# Patient Record
Sex: MALE | Race: WHITE | Employment: FULL TIME | URBAN - METROPOLITAN AREA
[De-identification: names, ages, dates, MRNs, and addresses within clinical notes are randomized per-mention and may not be internally consistent; named-entity substitution may affect disease eponyms.]

---

## 2017-08-09 ENCOUNTER — APPOINTMENT (OUTPATIENT)
Dept: OTHER | Facility: HOSPITAL | Age: 52
End: 2017-08-09
Attending: FAMILY MEDICINE

## 2017-08-10 PROBLEM — R11.2 NON-INTRACTABLE VOMITING WITH NAUSEA, UNSPECIFIED VOMITING TYPE: Status: ACTIVE | Noted: 2017-08-10

## 2017-08-18 PROCEDURE — 88305 TISSUE EXAM BY PATHOLOGIST: CPT | Performed by: SPECIALIST

## 2019-03-21 PROCEDURE — 88305 TISSUE EXAM BY PATHOLOGIST: CPT | Performed by: SPECIALIST

## 2019-04-04 ENCOUNTER — HOSPITAL ENCOUNTER (OUTPATIENT)
Age: 54
Discharge: HOME | End: 2019-04-04
Payer: COMMERCIAL

## 2019-04-04 VITALS
OXYGEN SATURATION: 98 % | WEIGHT: 145 LBS | BODY MASS INDEX: 21.98 KG/M2 | HEART RATE: 68 BPM | HEIGHT: 68 IN | RESPIRATION RATE: 16 BRPM | DIASTOLIC BLOOD PRESSURE: 84 MMHG | SYSTOLIC BLOOD PRESSURE: 125 MMHG | TEMPERATURE: 98 F

## 2019-04-04 DIAGNOSIS — S61.211A LACERATION OF LEFT INDEX FINGER WITHOUT FOREIGN BODY WITHOUT DAMAGE TO NAIL, INITIAL ENCOUNTER: Primary | ICD-10-CM

## 2019-04-04 PROCEDURE — 99213 OFFICE O/P EST LOW 20 MIN: CPT

## 2019-04-04 PROCEDURE — 12001 RPR S/N/AX/GEN/TRNK 2.5CM/<: CPT

## 2019-04-04 PROCEDURE — 99212 OFFICE O/P EST SF 10 MIN: CPT

## 2019-04-04 NOTE — ED PROVIDER NOTES
[place] Se[place] [id number] [facility] [place]    History   Patient presents with:  Laceration Abrasion (integumentary)    Stated Complaint: left hand laceration     HPI    Patient is a pleasant 38-year-old male.   Medical history of hypertension and hy 172.7 cm (5' 8\")   Wt 65.8 kg   SpO2 98%   BMI 22.05 kg/m²         Physical Exam    Gen: Well appearing, well groomed, alert and aware x 3  Neck: Supple, full range of motion, no thyromegaly or lymphadenopathy.   Eye examination: EOMs are intact, normal co

## 2019-04-12 ENCOUNTER — HOSPITAL ENCOUNTER (OUTPATIENT)
Age: 54
Discharge: HOME | End: 2019-04-12
Payer: COMMERCIAL

## 2019-04-12 VITALS
TEMPERATURE: 97 F | DIASTOLIC BLOOD PRESSURE: 81 MMHG | WEIGHT: 145 LBS | RESPIRATION RATE: 20 BRPM | SYSTOLIC BLOOD PRESSURE: 136 MMHG | HEART RATE: 70 BPM | HEIGHT: 68 IN | BODY MASS INDEX: 21.98 KG/M2 | OXYGEN SATURATION: 97 %

## 2019-04-12 DIAGNOSIS — Z48.02 ENCOUNTER FOR REMOVAL OF SUTURES: ICD-10-CM

## 2019-04-12 DIAGNOSIS — S61.211D LACERATION OF LEFT INDEX FINGER WITHOUT FOREIGN BODY WITHOUT DAMAGE TO NAIL, SUBSEQUENT ENCOUNTER: Primary | ICD-10-CM

## 2019-04-12 NOTE — ED PROVIDER NOTES
Patient Seen in: [address] [facility] [place]    History   Patient presents with:  Griffin Cherry (ingtegumentary)    Stated Complaint: suture removal    HPI    CHIEF COMPLAINT: Suture removal     HISTORY OF PRESENT ILLNESS: Patient is a [place] Lani Tobacco Smoker        Types: Cigars      Smokeless tobacco: Never Used      Tobacco comment: occasional cigar about 2 a month    Alcohol use: Not on file    Drug use: Not on file      Review of Systems    Positive for stated complaint: suture removal  Othe sutures    Disposition:  Discharge  4/12/2019  5:03 pm    Follow-up:  Luigi Carranza  [address] [phone]      As needed        Medications Prescribed:  Discharge Medication List as of 4/12/2019  5:04 PM

## 2019-04-12 NOTE — ED INITIAL ASSESSMENT (HPI)
Pt here for removal of 3 sutures from left 2nd finger. Placed 6 days ago. Denies redness/drng. Sts area is tender.

## 2019-06-19 ENCOUNTER — APPOINTMENT (OUTPATIENT)
Dept: OTHER | Facility: HOSPITAL | Age: 54
End: 2019-06-19
Attending: PREVENTIVE MEDICINE

## 2019-09-18 ENCOUNTER — HOSPITAL ENCOUNTER (OUTPATIENT)
Dept: CT IMAGING | Facility: HOSPITAL | Age: 54
Discharge: HOME | End: 2019-09-18
Attending: FAMILY MEDICINE

## 2019-09-18 DIAGNOSIS — Z13.6 SCREENING FOR CARDIOVASCULAR CONDITION: ICD-10-CM

## 2019-09-18 NOTE — PROGRESS NOTES
Pt. Seen  for Heart Scan. Preliminary Score: 4.7    BP: 142/86  Reports checks at home. Runs 534/12-99. Recently lost weight and quit BP meds.     Cholestec lab results: Declined, had in May  On Simvastatin:    Preliminary results and risk factors revi

## 2019-10-07 ENCOUNTER — HOSPITAL ENCOUNTER (OUTPATIENT)
Dept: CARDIOLOGY CLINIC | Facility: HOSPITAL | Age: 54
Discharge: HOME | End: 2019-10-07
Attending: FAMILY MEDICINE

## 2019-10-07 DIAGNOSIS — Z13.9 ENCOUNTER FOR SCREENING: ICD-10-CM

## 2019-10-07 NOTE — PROGRESS NOTES
Pt seen at [facility] for Health Aware Stroke Screening tests:  PRELIMINARY results as follows:    Carotid US= Right ICA; #2, <50% plaque present                       Left ICA; #2, <50% plaque present    Abdominal Aorta US= no aneurysm     BW=509/82

## 2024-01-10 ENCOUNTER — HOSPITAL ENCOUNTER (OUTPATIENT)
Age: 59
Discharge: HOME | End: 2024-01-10
Payer: COMMERCIAL

## 2024-01-10 VITALS
BODY MASS INDEX: 26.52 KG/M2 | HEART RATE: 69 BPM | DIASTOLIC BLOOD PRESSURE: 86 MMHG | RESPIRATION RATE: 18 BRPM | OXYGEN SATURATION: 97 % | WEIGHT: 175 LBS | SYSTOLIC BLOOD PRESSURE: 139 MMHG | HEIGHT: 68 IN | TEMPERATURE: 97 F

## 2024-01-10 DIAGNOSIS — J01.10 ACUTE FRONTAL SINUSITIS, RECURRENCE NOT SPECIFIED: Primary | ICD-10-CM

## 2024-01-10 DIAGNOSIS — R09.82 POSTNASAL DRIP: ICD-10-CM

## 2024-01-10 LAB — SARS-COV-2 RNA RESP QL NAA+PROBE: NOT DETECTED

## 2024-01-10 PROCEDURE — 99203 OFFICE O/P NEW LOW 30 MIN: CPT | Performed by: PHYSICIAN ASSISTANT

## 2024-01-10 PROCEDURE — U0002 COVID-19 LAB TEST NON-CDC: HCPCS | Performed by: PHYSICIAN ASSISTANT

## 2024-01-10 RX ORDER — SIMVASTATIN 40 MG
TABLET ORAL
COMMUNITY
Start: 2023-02-09 | End: 2024-02-10

## 2024-01-10 NOTE — ED PROVIDER NOTES
Patient Seen in: [facility]      History     Chief Complaint   Patient presents with    Sore Throat    Cough/URI     Stated Complaint: sore throat    Subjective:   HPI    57 YO male presents to immediate care for evaluation of frontal sinus pressure felt most at the left frontal sinus and postnasal drip that causes throat irritation and infrequent cough starting 3 days ago.  Patient states ibuprofen 400 mg resolves sinus pressure.  He denies fever/chills or any other physical complaints at this time.        Objective:   Past Medical History:   Diagnosis Date    Anxiety     Essential hypertension     Hyperlipidemia               Past Surgical History:   Procedure Laterality Date    COLONOSCOPY  12/2015    adenoma, f/u 3 yrs    COLONOSCOPY N/A 3/21/2019    Procedure: COLONOSCOPY, POSSIBLE BIOPSY, POSSIBLE POLYPECTOMY 22021;  Surgeon: Sonia Sofia MD;  [place] [facility] [place] [facility] [place]    OTHER      orthoscopic to left hand and wrist                No pertinent social history.            Review of Systems    Positive for stated complaint: sore throat  Other systems are as noted in HPI.  Constitutional and vital signs reviewed.      All other systems reviewed and negative except as noted above.    Physical Exam     ED Triage Vitals [01/10/24 1648]   /86   Pulse 69   Resp 18   Temp 96.7 °F (35.9 °C)   Temp src Temporal   SpO2 97 %   O2 Device None (Room air)       Current:/86   Pulse 69   Temp 96.7 °F (35.9 °C) (Temporal)   Resp 18   Ht 172.7 cm (5' 8\")   Wt 79.4 kg   SpO2 97%   BMI 26.61 kg/m²         Physical Exam  Vitals and nursing note reviewed.   Constitutional:       General: He is not in acute distress.     Appearance: He is well-developed. He is not ill-appearing, toxic-appearing or diaphoretic.   HENT:      Right Ear: Tympanic membrane is not erythematous.      Left Ear: Tympanic membrane is not erythematous.      Nose:      Right Sinus: No maxillary sinus tenderness or  frontal sinus tenderness.      Left Sinus: No maxillary sinus tenderness or frontal sinus tenderness.      Mouth/Throat:      Mouth: Mucous membranes are moist.      Pharynx: Oropharynx is clear.   Cardiovascular:      Rate and Rhythm: Normal rate.      Heart sounds: Normal heart sounds.   Pulmonary:      Effort: Pulmonary effort is normal. No respiratory distress.      Breath sounds: Normal breath sounds. No wheezing.   Neurological:      Mental Status: He is alert and oriented to person, place, and time.   Psychiatric:         Mood and Affect: Mood normal.         Behavior: Behavior normal.           ED Course     Labs Reviewed   RAPID SARS-COV-2 BY PCR - Normal          MDM      Well-appearing 59 YO male presents to immediate care for evaluation of frontal sinus pressure felt most at the left frontal sinus and postnasal drip that causes throat irritation and infrequent cough starting 3 days ago.      Patient is afebrile with reassuring physical exam. No facial tenderness and he denies sinus pressure at this time.  States ibuprofen resolved sinus pressure.  COVID-negative.  Discussed with patient that sinusitis is likely viral.  Patient politely declined prednisone.  He will continue ibuprofen instead.  Return instructions discussed with patient understanding.        Medical Decision Making  Amount and/or Complexity of Data Reviewed  Labs: ordered. Decision-making details documented in ED Course.    Risk  OTC drugs.        Disposition and Plan     Clinical Impression:  1. Acute frontal sinusitis, recurrence not specified    2. Postnasal drip         Disposition:  Discharge  1/10/2024  5:21 pm    Follow-up:  [facility] [address] [address]  [phone]    If symptoms worsen          Medications Prescribed:  Discharge Medication List as of 1/10/2024  5:25 PM

## 2024-03-04 ENCOUNTER — HOSPITAL ENCOUNTER (OUTPATIENT)
Age: 59
Discharge: HOME | End: 2024-03-04
Payer: COMMERCIAL

## 2024-03-04 ENCOUNTER — APPOINTMENT (OUTPATIENT)
Dept: GENERAL RADIOLOGY | Age: 59
End: 2024-03-04
Attending: NURSE PRACTITIONER
Payer: COMMERCIAL

## 2024-03-04 VITALS
BODY MASS INDEX: 26.52 KG/M2 | HEIGHT: 68 IN | WEIGHT: 175 LBS | TEMPERATURE: 97 F | RESPIRATION RATE: 16 BRPM | HEART RATE: 62 BPM | DIASTOLIC BLOOD PRESSURE: 71 MMHG | OXYGEN SATURATION: 97 % | SYSTOLIC BLOOD PRESSURE: 118 MMHG

## 2024-03-04 DIAGNOSIS — J32.9 SINOBRONCHITIS: Primary | ICD-10-CM

## 2024-03-04 DIAGNOSIS — J40 SINOBRONCHITIS: Primary | ICD-10-CM

## 2024-03-04 PROCEDURE — 71046 X-RAY EXAM CHEST 2 VIEWS: CPT | Performed by: NURSE PRACTITIONER

## 2024-03-04 PROCEDURE — 99204 OFFICE O/P NEW MOD 45 MIN: CPT | Performed by: NURSE PRACTITIONER

## 2024-03-04 RX ORDER — LISINOPRIL 10 MG/1
10 TABLET ORAL
COMMUNITY
Start: 2024-01-15

## 2024-03-04 RX ORDER — AMOXICILLIN AND CLAVULANATE POTASSIUM 875; 125 MG/1; MG/1
1 TABLET, FILM COATED ORAL 2 TIMES DAILY
Qty: 20 TABLET | Refills: 0 | Status: SHIPPED | OUTPATIENT
Start: 2024-03-04 | End: 2024-03-14

## 2024-03-04 RX ORDER — PREDNISONE 20 MG/1
40 TABLET ORAL DAILY
Qty: 10 TABLET | Refills: 0 | Status: SHIPPED | OUTPATIENT
Start: 2024-03-04 | End: 2024-03-09

## 2024-03-04 RX ORDER — BENZONATATE 100 MG/1
100 CAPSULE ORAL 3 TIMES DAILY PRN
Qty: 30 CAPSULE | Refills: 0 | Status: SHIPPED | OUTPATIENT
Start: 2024-03-04 | End: 2024-04-03

## 2024-03-04 NOTE — ED PROVIDER NOTES
Patient Seen in: [facility]      History     Chief Complaint   Patient presents with    Cough     Stated Complaint: cough    Subjective:   HPI  58-year-old male with anxiety, hypertension, and hyperlipidemia presents complaining of approximately 1 week of postnasal drip with a dry cough.  He denies any fever or chills.  He denies any chest pain or shortness of breath.  He is not a smoker.  He denies any history of pneumonia.    Objective:   Past Medical History:   Diagnosis Date    Anxiety     Essential hypertension     Hyperlipidemia               Past Surgical History:   Procedure Laterality Date    COLONOSCOPY  12/2015    adenoma, f/u 3 yrs    COLONOSCOPY N/A 3/21/2019    Procedure: COLONOSCOPY, POSSIBLE BIOPSY, POSSIBLE POLYPECTOMY 23694;  Surgeon: Sonia Sofia MD;  [place] [facility] [place] [facility] [facility]    OTHER      orthoscopic to left hand and wrist                Social History     Socioeconomic History    Marital status:    Tobacco Use    Smoking status: Light Smoker     Types: Cigars    Smokeless tobacco: Never    Tobacco comments:     occasional cigar about 2 a month              Review of Systems   All other systems reviewed and are negative.      Positive for stated complaint: cough  Other systems are as noted in HPI.  Constitutional and vital signs reviewed.      All other systems reviewed and negative except as noted above.    Physical Exam     ED Triage Vitals [03/04/24 1236]   /71   Pulse 62   Resp 16   Temp 97.3 °F (36.3 °C)   Temp src Temporal   SpO2 97 %   O2 Device None (Room air)       Current:/71   Pulse 62   Temp 97.3 °F (36.3 °C) (Temporal)   Resp 16   Ht 172.7 cm (5' 8\")   Wt 79.4 kg   SpO2 97%   BMI 26.61 kg/m²         Physical Exam  Vitals and nursing note reviewed.   Constitutional:       General: He is not in acute distress.     Appearance: He is well-developed. He is not ill-appearing or toxic-appearing.   HENT:      Right Ear: Tympanic membrane,  ear canal and external ear normal.      Left Ear: Tympanic membrane, ear canal and external ear normal.      Nose: Congestion present. No rhinorrhea.      Mouth/Throat:      Pharynx: No oropharyngeal exudate or posterior oropharyngeal erythema.   Cardiovascular:      Rate and Rhythm: Normal rate and regular rhythm.      Heart sounds: Normal heart sounds.   Pulmonary:      Effort: Pulmonary effort is normal. No respiratory distress.      Comments: Slightly decreased right upper lobe lung sounds  Skin:     General: Skin is warm and dry.   Neurological:      Mental Status: He is alert and oriented to person, place, and time.               ED Course   Labs Reviewed - No data to display          XR CHEST PA + LAT CHEST (CPT=71046)    Result Date: 3/4/2024  PROCEDURE:  XR CHEST PA + LAT CHEST (CPT=71046)  INDICATIONS:  cough  COMPARISON:  [facility], XR, XR CHEST PA + LAT CHEST (CPT=71020), 12/30/2016, 10:44 AM.  TECHNIQUE:  PA and lateral chest radiographs were obtained.  PATIENT STATED HISTORY: (As transcribed by Technologist)  The patient has a cough for one week.    FINDINGS:  LUNGS:  No focal consolidation.  Normal vascularity. CARDIAC:  Normal size cardiac silhouette. MEDIASTINUM:  Normal. PLEURA:  Normal.  No pleural effusions. BONES:  Normal for age.            CONCLUSION:  Negative exam.   LOCATION:  [place]   Dictated by (CST): Alexander Leone DO on 3/04/2024 at 1:35 PM     Finalized by (CST): Alexander Leone DO on 3/04/2024 at 1:35 PM               [facility]     Medical Decision Making  58-year-old male with anxiety, hypertension, and hyperlipidemia presents complaining of approximately 1 week of postnasal drip with a dry cough.  He denies any fever or chills.  He denies any chest pain or shortness of breath.  He is not a smoker.  He denies any history of pneumonia.    Clinical impression: Sinobronchitis    Differential diagnosis: Sinusitis, bronchitis, pneumonia    Patient with over a week of symptoms.   Chest x-ray with negative exam.  Patient is afebrile nontoxic with normal oxygen on room air.  He will be discharged with Tessalon for cough suppression as well as antibiotic and steroids.  I discussed with him that the cough might persist regardless of the antibiotic.  Patient agreed with discharge and plan of care.    Problems Addressed:  Sinobronchitis: acute illness or injury    Amount and/or Complexity of Data Reviewed  Radiology: ordered and independent interpretation performed.     Details: Chest x-ray with no consolidation        Disposition and Plan     Clinical Impression:  1. Sinobronchitis         Disposition:  Discharge  3/4/2024  1:40 pm    Follow-up:  No follow-up provider specified.        Medications Prescribed:  Discharge Medication List as of 3/4/2024  1:41 PM        START taking these medications    Details   amoxicillin clavulanate 875-125 MG Oral Tab Take 1 tablet by mouth 2 (two) times daily for 10 days., Normal, Disp-20 tablet, R-0      predniSONE 20 MG Oral Tab Take 2 tablets (40 mg total) by mouth daily for 5 days., Normal, Disp-10 tablet, R-0      benzonatate 100 MG Oral Cap Take 1 capsule (100 mg total) by mouth 3 (three) times daily as needed for cough., Normal, Disp-30 capsule, R-0

## 2024-03-04 NOTE — DISCHARGE INSTRUCTIONS
Take medications as directed.  You may also take Coricidin for your cough.  Follow-up with your primary care doctor if symptoms persist.

## 2025-04-01 ENCOUNTER — APPOINTMENT (OUTPATIENT)
Dept: DERMATOLOGY | Age: 60
End: 2025-04-01

## 2025-04-01 DIAGNOSIS — L98.8 RHYTIDES: ICD-10-CM

## 2025-04-01 DIAGNOSIS — L82.1 SEBORRHEIC KERATOSIS: Primary | ICD-10-CM

## 2025-04-01 DIAGNOSIS — L85.3 XEROSIS CUTIS: ICD-10-CM

## 2025-04-01 PROCEDURE — 99203 OFFICE O/P NEW LOW 30 MIN: CPT | Performed by: DERMATOLOGY

## 2025-04-01 RX ORDER — SILDENAFIL 100 MG/1
100 TABLET, FILM COATED ORAL DAILY PRN
COMMUNITY
Start: 2024-12-30

## 2025-04-01 RX ORDER — ESCITALOPRAM OXALATE 10 MG/1
10 TABLET ORAL DAILY
COMMUNITY

## 2025-04-01 RX ORDER — LISINOPRIL 10 MG/1
10 TABLET ORAL DAILY
COMMUNITY
Start: 2024-12-19